# Patient Record
Sex: FEMALE | Race: WHITE | NOT HISPANIC OR LATINO | Employment: FULL TIME | URBAN - METROPOLITAN AREA
[De-identification: names, ages, dates, MRNs, and addresses within clinical notes are randomized per-mention and may not be internally consistent; named-entity substitution may affect disease eponyms.]

---

## 2023-05-04 ENCOUNTER — OFFICE VISIT (OUTPATIENT)
Dept: PRIMARY CARE | Facility: CLINIC | Age: 48
End: 2023-05-04
Payer: COMMERCIAL

## 2023-05-04 VITALS
DIASTOLIC BLOOD PRESSURE: 78 MMHG | SYSTOLIC BLOOD PRESSURE: 110 MMHG | BODY MASS INDEX: 23.43 KG/M2 | WEIGHT: 140.6 LBS | HEART RATE: 71 BPM | HEIGHT: 65 IN | OXYGEN SATURATION: 99 %

## 2023-05-04 DIAGNOSIS — K21.9 GASTROESOPHAGEAL REFLUX DISEASE WITHOUT ESOPHAGITIS: ICD-10-CM

## 2023-05-04 DIAGNOSIS — F32.1 DEPRESSION, MAJOR, SINGLE EPISODE, MODERATE (MULTI): ICD-10-CM

## 2023-05-04 DIAGNOSIS — J01.10 ACUTE FRONTAL SINUSITIS, RECURRENCE NOT SPECIFIED: Primary | ICD-10-CM

## 2023-05-04 PROCEDURE — 99214 OFFICE O/P EST MOD 30 MIN: CPT | Performed by: FAMILY MEDICINE

## 2023-05-04 RX ORDER — BUPROPION HYDROCHLORIDE 150 MG/1
1 TABLET, EXTENDED RELEASE ORAL 2 TIMES DAILY
COMMUNITY
Start: 2018-02-02 | End: 2023-05-04 | Stop reason: SDUPTHER

## 2023-05-04 RX ORDER — PANTOPRAZOLE SODIUM 40 MG/1
1 TABLET, DELAYED RELEASE ORAL DAILY
COMMUNITY
End: 2023-05-04 | Stop reason: SDUPTHER

## 2023-05-04 RX ORDER — PANTOPRAZOLE SODIUM 40 MG/1
40 TABLET, DELAYED RELEASE ORAL DAILY
Qty: 90 TABLET | Refills: 1 | Status: SHIPPED | OUTPATIENT
Start: 2023-05-04 | End: 2023-11-17 | Stop reason: SDUPTHER

## 2023-05-04 RX ORDER — LANOLIN ALCOHOL/MO/W.PET/CERES
CREAM (GRAM) TOPICAL
COMMUNITY

## 2023-05-04 RX ORDER — NICOTINE POLACRILEX 2 MG
GUM BUCCAL
COMMUNITY

## 2023-05-04 RX ORDER — CALCIUM CARBONATE 300MG(750)
400 TABLET,CHEWABLE ORAL DAILY
COMMUNITY

## 2023-05-04 RX ORDER — AZITHROMYCIN 250 MG/1
TABLET, FILM COATED ORAL
Qty: 6 TABLET | Refills: 0 | Status: SHIPPED | OUTPATIENT
Start: 2023-05-04 | End: 2023-05-09

## 2023-05-04 RX ORDER — BUPROPION HYDROCHLORIDE 150 MG/1
150 TABLET, EXTENDED RELEASE ORAL 2 TIMES DAILY
Qty: 180 TABLET | Refills: 1 | Status: SHIPPED | OUTPATIENT
Start: 2023-05-04 | End: 2023-11-10

## 2023-05-04 ASSESSMENT — ENCOUNTER SYMPTOMS
APPETITE CHANGE: 0
NAUSEA: 0
UNEXPECTED WEIGHT CHANGE: 0

## 2023-05-04 NOTE — PROGRESS NOTES
"Subjective   Patient ID: Kyung Velazquez is a 47 y.o. female who presents for pressure (In head not sure if sinus, neuro surgeon thought everything was OK s/p aneurysm next CT is due next year, sl cough, tension in back of neck and magnified pressure with the change in temp. No fevers).    HPI   Patient is seen in the office complaining of sinus congestion.  Complains of stuffed up head, drainage, cough.  Patient complains symptoms are moderate and patient feels mildly ill.  The symptoms are improved with OTC medication.  Patient also notes associated fatigue.   Review of Systems   Constitutional:  Negative for appetite change and unexpected weight change.   Eyes:  Negative for visual disturbance.   Gastrointestinal:  Negative for nausea.       Objective   /80   Pulse 71   Ht 1.638 m (5' 4.5\")   Wt 63.8 kg (140 lb 9.6 oz)   SpO2 99%   BMI 23.76 kg/m²     Physical Exam  HENT:      Head: Normocephalic and atraumatic.      Nose: Nose normal.      Mouth/Throat:      Mouth: Mucous membranes are moist.      Pharynx: No oropharyngeal exudate.   Eyes:      Extraocular Movements: Extraocular movements intact.      Conjunctiva/sclera: Conjunctivae normal.      Pupils: Pupils are equal, round, and reactive to light.   Cardiovascular:      Rate and Rhythm: Normal rate and regular rhythm.   Pulmonary:      Effort: Pulmonary effort is normal.   Abdominal:      General: There is no distension.      Palpations: Abdomen is soft.   Musculoskeletal:      Cervical back: Normal range of motion and neck supple.   Lymphadenopathy:      Cervical: No cervical adenopathy.   Neurological:      General: No focal deficit present.      Mental Status: She is alert.   Psychiatric:         Attention and Perception: Attention normal.         Speech: Speech normal.         Behavior: Behavior is cooperative.         Assessment/Plan   Diagnoses and all orders for this visit:  Acute frontal sinusitis, recurrence not " specified  Comments:  Supportive care and antibiotic  Doubt intracranial process given normal blood pressure and nonfocal exam  Orders:  -     azithromycin (Zithromax) 250 mg tablet; Take 2 tablets (500 mg) by mouth once daily for 1 day, THEN 1 tablet (250 mg) once daily for 4 days.  Depression, major, single episode, moderate (CMS/HCC)  Comments:  Controlled we will give refills  Orders:  -     buPROPion SR (Wellbutrin SR) 150 mg 12 hr tablet; Take 1 tablet (150 mg) by mouth 2 times a day.  Gastroesophageal reflux disease without esophagitis  Comments:  Controlled  Orders:  -     pantoprazole (ProtoNix) 40 mg EC tablet; Take 1 tablet (40 mg) by mouth once daily.  RTC if symptoms not improving in 1 to 2 weeks, discussed need to go to ED if any focal symptoms or symptoms of stroke appear

## 2023-05-04 NOTE — LETTER
May 4, 2023     Patient: Kyung Velazquez   YOB: 1975   Date of Visit: 5/4/2023       To Whom It May Concern:    Kyung Velazquez was seen in my clinic on 5/4/2023 at 10:00 am. Please excuse Kyung for her absence from work on this day to make the appointment.    If you have any questions or concerns, please don't hesitate to call.         Sincerely,         Taz Rodriguez MD        CC: No Recipients

## 2023-10-20 ENCOUNTER — HOSPITAL ENCOUNTER (OUTPATIENT)
Dept: RADIOLOGY | Facility: HOSPITAL | Age: 48
Discharge: HOME | End: 2023-10-20
Payer: COMMERCIAL

## 2023-10-20 ENCOUNTER — ANCILLARY PROCEDURE (OUTPATIENT)
Dept: RADIOLOGY | Facility: HOSPITAL | Age: 48
End: 2023-10-20
Payer: COMMERCIAL

## 2023-10-20 DIAGNOSIS — R92.8 OTHER ABNORMAL AND INCONCLUSIVE FINDINGS ON DIAGNOSTIC IMAGING OF BREAST: ICD-10-CM

## 2023-10-20 PROCEDURE — 77065 DX MAMMO INCL CAD UNI: CPT | Mod: RIGHT SIDE | Performed by: RADIOLOGY

## 2023-10-20 PROCEDURE — 77061 BREAST TOMOSYNTHESIS UNI: CPT | Mod: RIGHT SIDE | Performed by: RADIOLOGY

## 2023-10-20 PROCEDURE — 77061 BREAST TOMOSYNTHESIS UNI: CPT | Mod: RT

## 2023-11-10 DIAGNOSIS — F32.1 DEPRESSION, MAJOR, SINGLE EPISODE, MODERATE (MULTI): ICD-10-CM

## 2023-11-10 PROBLEM — J44.9 CHRONIC OBSTRUCTIVE PULMONARY DISEASE (MULTI): Status: ACTIVE | Noted: 2023-11-10

## 2023-11-10 PROBLEM — M25.571 ACUTE RIGHT ANKLE PAIN: Status: RESOLVED | Noted: 2023-11-10 | Resolved: 2023-11-10

## 2023-11-10 PROBLEM — I72.9 ANEURYSM (CMS-HCC): Status: ACTIVE | Noted: 2023-11-10

## 2023-11-10 PROBLEM — K14.6 BURNING TONGUE: Status: ACTIVE | Noted: 2023-11-10

## 2023-11-10 PROBLEM — F17.200 SMOKER: Status: ACTIVE | Noted: 2023-11-10

## 2023-11-10 PROBLEM — J30.1 SEASONAL ALLERGIC RHINITIS DUE TO POLLEN: Status: ACTIVE | Noted: 2023-11-10

## 2023-11-10 PROBLEM — H72.92 PERFORATION OF LEFT TYMPANIC MEMBRANE: Status: ACTIVE | Noted: 2023-11-10

## 2023-11-10 PROBLEM — M35.00 SJOGREN'S DISEASE (MULTI): Status: ACTIVE | Noted: 2023-11-10

## 2023-11-10 PROBLEM — I60.9 SUBARACHNOID HEMORRHAGE, NONTRAUMATIC (MULTI): Status: ACTIVE | Noted: 2023-11-10

## 2023-11-10 PROBLEM — E53.8 VITAMIN B 12 DEFICIENCY: Status: ACTIVE | Noted: 2023-11-10

## 2023-11-10 PROBLEM — E55.9 VITAMIN D DEFICIENCY: Status: ACTIVE | Noted: 2023-11-10

## 2023-11-10 PROBLEM — K21.00 GASTROESOPHAGEAL REFLUX DISEASE WITH ESOPHAGITIS: Status: ACTIVE | Noted: 2023-11-10

## 2023-11-10 PROBLEM — R53.81 MALAISE AND FATIGUE: Status: ACTIVE | Noted: 2023-11-10

## 2023-11-10 PROBLEM — H66.92 LEFT OTITIS MEDIA: Status: RESOLVED | Noted: 2023-11-10 | Resolved: 2023-11-10

## 2023-11-10 PROBLEM — J01.90 ACUTE SINUSITIS: Status: RESOLVED | Noted: 2023-11-10 | Resolved: 2023-11-10

## 2023-11-10 PROBLEM — M25.471 EFFUSION OF RIGHT ANKLE: Status: ACTIVE | Noted: 2023-11-10

## 2023-11-10 PROBLEM — H60.332 ACUTE SWIMMER'S EAR OF LEFT SIDE: Status: RESOLVED | Noted: 2023-11-10 | Resolved: 2023-11-10

## 2023-11-10 PROBLEM — R91.8 LUNG NODULES: Status: ACTIVE | Noted: 2023-11-10

## 2023-11-10 PROBLEM — R53.83 MALAISE AND FATIGUE: Status: ACTIVE | Noted: 2023-11-10

## 2023-11-10 RX ORDER — BUPROPION HYDROCHLORIDE 150 MG/1
150 TABLET, EXTENDED RELEASE ORAL 2 TIMES DAILY
Qty: 180 TABLET | Refills: 1 | Status: SHIPPED | OUTPATIENT
Start: 2023-11-10

## 2023-11-17 ENCOUNTER — OFFICE VISIT (OUTPATIENT)
Dept: DERMATOLOGY | Facility: CLINIC | Age: 48
End: 2023-11-17
Payer: COMMERCIAL

## 2023-11-17 ENCOUNTER — TELEPHONE (OUTPATIENT)
Dept: PRIMARY CARE | Facility: CLINIC | Age: 48
End: 2023-11-17

## 2023-11-17 DIAGNOSIS — D22.9 BENIGN NEVUS: ICD-10-CM

## 2023-11-17 DIAGNOSIS — L82.1 SEBORRHEIC KERATOSIS: ICD-10-CM

## 2023-11-17 DIAGNOSIS — D18.01 ANGIOMA OF SKIN: ICD-10-CM

## 2023-11-17 DIAGNOSIS — L82.0 INFLAMED SEBORRHEIC KERATOSIS: ICD-10-CM

## 2023-11-17 DIAGNOSIS — L57.9 SKIN CHANGES DUE TO CHRONIC EXPOSURE TO NONIONIZING RADIATION: ICD-10-CM

## 2023-11-17 DIAGNOSIS — D48.5 NEOPLASM OF UNCERTAIN BEHAVIOR OF SKIN: Primary | ICD-10-CM

## 2023-11-17 DIAGNOSIS — L81.4 LENTIGO: ICD-10-CM

## 2023-11-17 DIAGNOSIS — K21.9 GASTROESOPHAGEAL REFLUX DISEASE WITHOUT ESOPHAGITIS: ICD-10-CM

## 2023-11-17 PROCEDURE — 99213 OFFICE O/P EST LOW 20 MIN: CPT | Performed by: NURSE PRACTITIONER

## 2023-11-17 PROCEDURE — 11102 TANGNTL BX SKIN SINGLE LES: CPT | Performed by: NURSE PRACTITIONER

## 2023-11-17 PROCEDURE — 11103 TANGNTL BX SKIN EA SEP/ADDL: CPT | Performed by: NURSE PRACTITIONER

## 2023-11-17 PROCEDURE — 17110 DESTRUCTION B9 LES UP TO 14: CPT | Performed by: NURSE PRACTITIONER

## 2023-11-17 PROCEDURE — 88305 TISSUE EXAM BY PATHOLOGIST: CPT | Performed by: DERMATOLOGY

## 2023-11-17 PROCEDURE — 88305 TISSUE EXAM BY PATHOLOGIST: CPT | Mod: TC,DER | Performed by: NURSE PRACTITIONER

## 2023-11-17 NOTE — PROGRESS NOTES
Subjective     Kyung Velazquez is a 48 y.o. female who presents for the following: Skin Check.     2 moles on the back have become very itchy and irritated at times. Very itchy lesions on the waistline, left shoulder and back.     Review of Systems:  No other skin or systemic complaints other than what is documented elsewhere in the note.    The following portions of the chart were reviewed this encounter and updated as appropriate:   Tobacco  Allergies  Meds  Problems  Med Hx  Surg Hx         Skin Cancer History  No skin cancer on file.      Specialty Problems    None       Objective   Well appearing patient in no apparent distress; mood and affect are within normal limits.    A full examination was performed including scalp, head, eyes, ears, nose, lips, neck, chest, axillae, abdomen, back, buttocks, bilateral upper extremities, bilateral lower extremities, hands, feet, fingers, toes, fingernails, and toenails. All findings within normal limits unless otherwise noted below.      Assessment/Plan   1. Neoplasm of uncertain behavior of skin (2)  Right Upper Back  4.5 x 4 mm flesh toned to pink papule    Lesion biopsy  Type of biopsy: tangential    Informed consent: discussed and consent obtained    Timeout: patient name, date of birth, surgical site, and procedure verified    Procedure prep:  Patient was prepped and draped  Anesthesia: the lesion was anesthetized in a standard fashion    Anesthetic:  1% lidocaine plain local infiltration  Instrument used: DermaBlade    Hemostasis achieved with: electrodesiccation    Outcome: patient tolerated procedure well    Post-procedure details: wound care instructions given    Additional details:  Cleaned area with isopropyl alcohol prior to anesthesia or biopsy. Applied thin layer of vaseline and covered with bandaid after procedure      Staff Communication: Dermatology Local Anesthesia: 1 % Plain Lidocaine - Amount:0.5ml per lesion    Left Upper Back  3 mm dark brown  erythematous papule    Staff Communication: Dermatology Local Anesthesia: 1 % Plain Lidocaine - Amount:0.5ml per lesion    Lesion biopsy  Type of biopsy: tangential    Informed consent: discussed and consent obtained    Timeout: patient name, date of birth, surgical site, and procedure verified    Procedure prep:  Patient was prepped and draped  Anesthesia: the lesion was anesthetized in a standard fashion    Anesthetic:  1% lidocaine plain local infiltration  Instrument used: DermaBlade    Hemostasis achieved with: electrodesiccation    Outcome: patient tolerated procedure well    Post-procedure details: wound care instructions given    Additional details:  Cleaned area with isopropyl alcohol prior to anesthesia or biopsy. Applied thin layer of vaseline and covered with bandaid after procedure      NUB  - Given uncertainty in clinical diagnosis, shave biopsy is recommended in clinic today.  - The patient expressed understanding, is in agreement with this plan, and wishes to proceed with biopsy.  - Oral and written wound care instructions provided.  - Advised patient that the office will call within 2 weeks to discuss biopsy results.          Return to clinic in 1 year for skin check/follow up or sooner if needed

## 2023-11-17 NOTE — LETTER
November 17, 2023     Patient: Kyung Velazquez   YOB: 1975   Date of Visit: 11/17/2023       To Whom It May Concern:    Kyung Velazquez was seen in my clinic on 11/17/2023 at 1:30 pm. Please excuse Kyung for her absence from work on this day to make the appointment.    If you have any questions or concerns, please don't hesitate to call.         Sincerely,         Aldair Woody, APRN-CNP        CC: No Recipients

## 2023-11-17 NOTE — TELEPHONE ENCOUNTER
Discussed with pt. She now says there is no refill after 5/2003 Advised I think they just need a new script will send it in and see if it generates a prior auth. but I don't think it will be needed. Also advised she is overdue for 6 month appt. and she will arrange this now.

## 2023-11-17 NOTE — PATIENT INSTRUCTIONS

## 2023-11-17 NOTE — TELEPHONE ENCOUNTER
PT states the pharmacy advised her ABHI would have to authorize a refill for the Pantoprazole 40mg, because they told her she cannot get a refill until May 2024. And without the prior auth it will cost over 100.00. pt gave number  [phone] to call [employer].

## 2023-11-21 LAB
LAB AP ASR DISCLAIMER: NORMAL
LABORATORY COMMENT REPORT: NORMAL
PATH REPORT.FINAL DX SPEC: NORMAL
PATH REPORT.GROSS SPEC: NORMAL
PATH REPORT.MICROSCOPIC SPEC OTHER STN: NORMAL
PATH REPORT.RELEVANT HX SPEC: NORMAL
PATH REPORT.TOTAL CANCER: NORMAL

## 2023-11-22 ENCOUNTER — TELEPHONE (OUTPATIENT)
Dept: DERMATOLOGY | Facility: CLINIC | Age: 48
End: 2023-11-22
Payer: COMMERCIAL

## 2023-11-22 NOTE — TELEPHONE ENCOUNTER
A - benign. No further treatment required.   B - benign. No further treatment required.     Return to clinic in 1 year for recheck or sooner if needed

## 2023-11-29 ENCOUNTER — OFFICE VISIT (OUTPATIENT)
Dept: PRIMARY CARE | Facility: CLINIC | Age: 48
End: 2023-11-29
Payer: COMMERCIAL

## 2023-11-29 VITALS
BODY MASS INDEX: 23.93 KG/M2 | TEMPERATURE: 99.1 F | WEIGHT: 143.6 LBS | HEIGHT: 65 IN | SYSTOLIC BLOOD PRESSURE: 112 MMHG | OXYGEN SATURATION: 98 % | DIASTOLIC BLOOD PRESSURE: 68 MMHG | HEART RATE: 78 BPM

## 2023-11-29 DIAGNOSIS — I67.1 CEREBRAL ANEURYSM, NONRUPTURED (HHS-HCC): Primary | ICD-10-CM

## 2023-11-29 DIAGNOSIS — J01.00 ACUTE MAXILLARY SINUSITIS, RECURRENCE NOT SPECIFIED: ICD-10-CM

## 2023-11-29 DIAGNOSIS — M35.00 SJOGREN'S SYNDROME, WITH UNSPECIFIED ORGAN INVOLVEMENT (MULTI): ICD-10-CM

## 2023-11-29 DIAGNOSIS — J43.1 PANLOBULAR EMPHYSEMA (MULTI): ICD-10-CM

## 2023-11-29 PROCEDURE — 99213 OFFICE O/P EST LOW 20 MIN: CPT | Performed by: FAMILY MEDICINE

## 2023-11-29 RX ORDER — AZITHROMYCIN 250 MG/1
TABLET, FILM COATED ORAL
Qty: 6 TABLET | Refills: 0 | Status: SHIPPED | OUTPATIENT
Start: 2023-11-29 | End: 2023-12-03

## 2023-11-29 RX ORDER — PANTOPRAZOLE SODIUM 40 MG/1
40 TABLET, DELAYED RELEASE ORAL DAILY
Qty: 90 TABLET | Refills: 0 | Status: SHIPPED | OUTPATIENT
Start: 2023-11-29 | End: 2024-03-29

## 2023-11-29 ASSESSMENT — PATIENT HEALTH QUESTIONNAIRE - PHQ9
1. LITTLE INTEREST OR PLEASURE IN DOING THINGS: NOT AT ALL
2. FEELING DOWN, DEPRESSED OR HOPELESS: NOT AT ALL
SUM OF ALL RESPONSES TO PHQ9 QUESTIONS 1 AND 2: 0

## 2023-11-29 ASSESSMENT — ENCOUNTER SYMPTOMS
COUGH: 1
SORE THROAT: 1
APPETITE CHANGE: 0
UNEXPECTED WEIGHT CHANGE: 0
FEVER: 1
NAUSEA: 0

## 2023-11-29 ASSESSMENT — COLUMBIA-SUICIDE SEVERITY RATING SCALE - C-SSRS
6. HAVE YOU EVER DONE ANYTHING, STARTED TO DO ANYTHING, OR PREPARED TO DO ANYTHING TO END YOUR LIFE?: NO
2. HAVE YOU ACTUALLY HAD ANY THOUGHTS OF KILLING YOURSELF?: NO
1. IN THE PAST MONTH, HAVE YOU WISHED YOU WERE DEAD OR WISHED YOU COULD GO TO SLEEP AND NOT WAKE UP?: NO

## 2023-11-29 NOTE — TELEPHONE ENCOUNTER
PT states she is having runny nose and sinus pressure with a low grade fever, with ear pain - her  Satya was in 2 days ago with the same symptoms. Requesting medication.

## 2023-11-29 NOTE — PROGRESS NOTES
"Subjective   Patient ID: Kyung Velazquez is a 48 y.o. female who presents for Sore Throat (Couple days), Fever, Cough, and Nasal Congestion.    Sore Throat   Associated symptoms include coughing.   Fever   Associated symptoms include coughing and a sore throat. Pertinent negatives include no nausea.   Cough  Associated symptoms include a fever and a sore throat.        Review of Systems   Constitutional:  Positive for fever. Negative for appetite change and unexpected weight change.   HENT:  Positive for sore throat.    Eyes:  Negative for visual disturbance.   Respiratory:  Positive for cough.    Gastrointestinal:  Negative for nausea.       Objective   /68   Pulse 78   Temp 37.3 °C (99.1 °F)   Ht 1.638 m (5' 4.5\")   Wt 65.1 kg (143 lb 9.6 oz)   SpO2 98%   BMI 24.27 kg/m²     Physical Exam  HENT:      Head: Normocephalic and atraumatic.      Nose: Nose normal.      Mouth/Throat:      Mouth: Mucous membranes are moist.      Pharynx: No oropharyngeal exudate.   Eyes:      Extraocular Movements: Extraocular movements intact.      Conjunctiva/sclera: Conjunctivae normal.      Pupils: Pupils are equal, round, and reactive to light.   Cardiovascular:      Rate and Rhythm: Normal rate and regular rhythm.   Pulmonary:      Effort: Pulmonary effort is normal.   Abdominal:      General: There is no distension.      Palpations: Abdomen is soft.   Musculoskeletal:      Cervical back: Normal range of motion and neck supple.   Lymphadenopathy:      Cervical: No cervical adenopathy.   Neurological:      General: No focal deficit present.      Mental Status: She is alert.   Psychiatric:         Attention and Perception: Attention normal.         Speech: Speech normal.         Behavior: Behavior is cooperative.         Assessment/Plan   Diagnoses and all orders for this visit:  Cerebral aneurysm, nonruptured  Comments:  Followed by neurosurgery  Sjogren's syndrome, with unspecified organ involvement (CMS/HCC)  Panlobular " emphysema (CMS/[facility])  Comments:  Stable and not acting up currently  Acute maxillary sinusitis, recurrence not specified  -     azithromycin (Zithromax) 250 mg tablet; Take 2 tablets (500 mg) by mouth once daily for 1 day, THEN 1 tablet (250 mg) once daily for 4 days.  Supportive care  Recheck 1 week if not better sooner if worse

## 2023-11-29 NOTE — TELEPHONE ENCOUNTER
Spoke with pt. Advised she would need to be seen and an appt. was arranged for today.   For some reason this refill was never sent in?

## 2023-12-21 ENCOUNTER — APPOINTMENT (OUTPATIENT)
Dept: PRIMARY CARE | Facility: CLINIC | Age: 48
End: 2023-12-21
Payer: COMMERCIAL

## 2024-01-08 ENCOUNTER — TELEPHONE (OUTPATIENT)
Dept: PRIMARY CARE | Facility: CLINIC | Age: 49
End: 2024-01-08
Payer: COMMERCIAL

## 2024-03-28 ENCOUNTER — HOSPITAL ENCOUNTER (OUTPATIENT)
Dept: RADIOLOGY | Facility: HOSPITAL | Age: 49
End: 2024-03-28
Payer: COMMERCIAL

## 2024-03-28 ENCOUNTER — APPOINTMENT (OUTPATIENT)
Dept: RADIOLOGY | Facility: HOSPITAL | Age: 49
End: 2024-03-28
Payer: COMMERCIAL

## 2024-03-28 ENCOUNTER — HOSPITAL ENCOUNTER (OUTPATIENT)
Dept: RADIOLOGY | Facility: HOSPITAL | Age: 49
Discharge: HOME | End: 2024-03-28
Payer: COMMERCIAL

## 2024-03-28 ENCOUNTER — TELEPHONE (OUTPATIENT)
Dept: PRIMARY CARE | Facility: CLINIC | Age: 49
End: 2024-03-28
Payer: COMMERCIAL

## 2024-03-28 VITALS — WEIGHT: 135 LBS | BODY MASS INDEX: 22.49 KG/M2 | HEIGHT: 65 IN

## 2024-03-28 DIAGNOSIS — R92.8 OTHER ABNORMAL AND INCONCLUSIVE FINDINGS ON DIAGNOSTIC IMAGING OF BREAST: ICD-10-CM

## 2024-03-28 DIAGNOSIS — K21.9 GASTROESOPHAGEAL REFLUX DISEASE WITHOUT ESOPHAGITIS: ICD-10-CM

## 2024-03-28 PROCEDURE — 77062 BREAST TOMOSYNTHESIS BI: CPT

## 2024-03-28 PROCEDURE — 77066 DX MAMMO INCL CAD BI: CPT | Performed by: STUDENT IN AN ORGANIZED HEALTH CARE EDUCATION/TRAINING PROGRAM

## 2024-03-28 PROCEDURE — 77062 BREAST TOMOSYNTHESIS BI: CPT | Performed by: STUDENT IN AN ORGANIZED HEALTH CARE EDUCATION/TRAINING PROGRAM

## 2024-03-28 NOTE — TELEPHONE ENCOUNTER
MEDICATION REFILL    Pharmacy Name:    [facility]  Medication requested             Pantoprazole    40 mg  Dosage     1 x daily  Quantity        90 days   Allergies     none given  Date of last visit    11/29/2023  Date of Next Appointment    04/17/2024

## 2024-03-29 DIAGNOSIS — K21.9 GASTROESOPHAGEAL REFLUX DISEASE WITHOUT ESOPHAGITIS: ICD-10-CM

## 2024-03-29 RX ORDER — PANTOPRAZOLE SODIUM 40 MG/1
40 TABLET, DELAYED RELEASE ORAL DAILY
Qty: 90 TABLET | Refills: 0 | Status: SHIPPED | OUTPATIENT
Start: 2024-03-29

## 2024-04-17 ENCOUNTER — APPOINTMENT (OUTPATIENT)
Dept: PRIMARY CARE | Facility: CLINIC | Age: 49
End: 2024-04-17
Payer: COMMERCIAL

## 2024-04-17 PROBLEM — B37.0 CANDIDIASIS OF MOUTH: Status: RESOLVED | Noted: 2024-04-17 | Resolved: 2024-04-17

## 2024-04-17 PROBLEM — M54.6 PAIN IN THORACIC SPINE: Status: ACTIVE | Noted: 2024-04-17

## 2024-04-17 PROBLEM — D22.5 MELANOCYTIC NEVI OF TRUNK: Status: ACTIVE | Noted: 2022-11-18

## 2024-04-17 PROBLEM — L85.3 XEROSIS CUTIS: Status: ACTIVE | Noted: 2021-12-14

## 2024-04-17 PROBLEM — D48.5 NEOPLASM OF UNCERTAIN BEHAVIOR OF SKIN: Status: ACTIVE | Noted: 2024-04-17

## 2024-04-17 PROBLEM — L91.8 OTHER HYPERTROPHIC DISORDERS OF THE SKIN: Status: ACTIVE | Noted: 2022-11-18

## 2024-04-17 PROBLEM — N39.0 ACUTE URINARY TRACT INFECTION: Status: RESOLVED | Noted: 2024-04-17 | Resolved: 2024-04-17

## 2024-04-17 PROBLEM — D22.60 MELANOCYTIC NEVI OF UNSPECIFIED UPPER LIMB, INCLUDING SHOULDER: Status: ACTIVE | Noted: 2020-06-22

## 2024-04-17 PROBLEM — R59.9 ADENOPATHY: Status: ACTIVE | Noted: 2024-04-17

## 2024-04-17 PROBLEM — L81.4 LENTIGINOSIS: Status: ACTIVE | Noted: 2022-11-18

## 2024-04-17 PROBLEM — D22.30 MELANOCYTIC NEVI OF UNSPECIFIED PART OF FACE: Status: ACTIVE | Noted: 2022-11-18

## 2024-04-17 PROBLEM — L57.9 SKIN CHANGES DUE TO CHRONIC EXPOSURE TO NONIONIZING RADIATION: Status: ACTIVE | Noted: 2024-04-17

## 2024-04-17 PROBLEM — F32.1 MAJOR DEPRESSIVE DISORDER, SINGLE EPISODE, MODERATE (MULTI): Status: ACTIVE | Noted: 2024-04-17

## 2024-04-17 PROBLEM — L81.4 OTHER MELANIN HYPERPIGMENTATION: Status: ACTIVE | Noted: 2022-11-18

## 2024-04-17 PROBLEM — L72.11 PILAR CYST: Status: ACTIVE | Noted: 2020-06-22

## 2024-04-17 PROBLEM — L72.8 OTHER FOLLICULAR CYSTS OF THE SKIN AND SUBCUTANEOUS TISSUE: Status: ACTIVE | Noted: 2020-06-22

## 2024-04-17 PROBLEM — D22.70 MELANOCYTIC NEVI OF UNSPECIFIED LOWER LIMB, INCLUDING HIP: Status: ACTIVE | Noted: 2022-11-18

## 2024-04-17 PROBLEM — R10.11 RIGHT UPPER QUADRANT ABDOMINAL PAIN: Status: RESOLVED | Noted: 2024-04-17 | Resolved: 2024-04-17

## 2024-04-17 PROBLEM — D22.9 MELANOCYTIC NEVUS OF SKIN: Status: ACTIVE | Noted: 2024-04-17

## 2024-04-17 PROBLEM — R11.2 NAUSEA AND VOMITING: Status: RESOLVED | Noted: 2024-04-17 | Resolved: 2024-04-17

## 2024-04-17 PROBLEM — R00.2 PALPITATIONS: Status: ACTIVE | Noted: 2024-04-17

## 2024-04-17 PROBLEM — L82.0 INFLAMED SEBORRHEIC KERATOSIS: Status: ACTIVE | Noted: 2022-11-18

## 2024-04-17 PROBLEM — L98.9 SKIN LESION: Status: ACTIVE | Noted: 2022-11-18

## 2024-06-24 ENCOUNTER — TELEPHONE (OUTPATIENT)
Dept: PRIMARY CARE | Facility: CLINIC | Age: 49
End: 2024-06-24
Payer: COMMERCIAL

## 2024-06-24 DIAGNOSIS — K21.9 GASTROESOPHAGEAL REFLUX DISEASE WITHOUT ESOPHAGITIS: ICD-10-CM

## 2024-06-24 DIAGNOSIS — F32.1 DEPRESSION, MAJOR, SINGLE EPISODE, MODERATE (MULTI): ICD-10-CM

## 2024-06-24 NOTE — TELEPHONE ENCOUNTER
Rx Refill Request Telephone Encounter    Name:  Kyung Velazquez  :  [id number]  Medication Name:    pantoprazole (ProtoNix) 40 mg EC tablet - Take 1 tablet (40 mg) by mouth once daily. - 90 day    buPROPion SR (Wellbutrin SR) 150 mg 12 hr tablet TAKE 1 TABLET TWICE A DAY - requesting 180 days  Specific Pharmacy location:  [facility]  Date of last appointment: 2023  Date of next appointment:  na  Best number to reach patient:  [phone]

## 2024-06-25 RX ORDER — BUPROPION HYDROCHLORIDE 150 MG/1
150 TABLET, EXTENDED RELEASE ORAL 2 TIMES DAILY
Qty: 180 TABLET | Refills: 1 | Status: SHIPPED | OUTPATIENT
Start: 2024-06-25

## 2024-06-25 RX ORDER — PANTOPRAZOLE SODIUM 40 MG/1
40 TABLET, DELAYED RELEASE ORAL DAILY
Qty: 90 TABLET | Refills: 3 | Status: SHIPPED | OUTPATIENT
Start: 2024-06-25

## 2024-09-04 DIAGNOSIS — R92.0 MICROCALCIFICATIONS OF THE BREAST: Primary | ICD-10-CM

## 2024-09-10 ENCOUNTER — APPOINTMENT (OUTPATIENT)
Dept: PRIMARY CARE | Facility: CLINIC | Age: 49
End: 2024-09-10
Payer: COMMERCIAL

## 2024-09-10 DIAGNOSIS — I67.1 CEREBRAL ARTERIAL ANEURYSM (HHS-HCC): ICD-10-CM

## 2024-09-24 ENCOUNTER — OFFICE VISIT (OUTPATIENT)
Dept: PRIMARY CARE | Facility: CLINIC | Age: 49
End: 2024-09-24
Payer: COMMERCIAL

## 2024-09-24 VITALS
HEIGHT: 65 IN | WEIGHT: 142.4 LBS | BODY MASS INDEX: 23.72 KG/M2 | HEART RATE: 78 BPM | DIASTOLIC BLOOD PRESSURE: 76 MMHG | TEMPERATURE: 97.6 F | SYSTOLIC BLOOD PRESSURE: 124 MMHG | OXYGEN SATURATION: 98 %

## 2024-09-24 DIAGNOSIS — J01.00 ACUTE MAXILLARY SINUSITIS, RECURRENCE NOT SPECIFIED: ICD-10-CM

## 2024-09-24 DIAGNOSIS — F32.1 DEPRESSION, MAJOR, SINGLE EPISODE, MODERATE (MULTI): ICD-10-CM

## 2024-09-24 DIAGNOSIS — H72.92 PERFORATION OF LEFT TYMPANIC MEMBRANE: ICD-10-CM

## 2024-09-24 DIAGNOSIS — H60.332 ACUTE SWIMMER'S EAR OF LEFT SIDE: Primary | ICD-10-CM

## 2024-09-24 DIAGNOSIS — I67.1 CEREBRAL ANEURYSM, NONRUPTURED (HHS-HCC): ICD-10-CM

## 2024-09-24 PROBLEM — J44.9 CHRONIC OBSTRUCTIVE PULMONARY DISEASE (MULTI): Status: RESOLVED | Noted: 2023-11-10 | Resolved: 2024-09-24

## 2024-09-24 PROBLEM — M35.00 SJOGREN'S DISEASE: Status: RESOLVED | Noted: 2023-11-10 | Resolved: 2024-09-24

## 2024-09-24 PROCEDURE — 99213 OFFICE O/P EST LOW 20 MIN: CPT | Performed by: FAMILY MEDICINE

## 2024-09-24 PROCEDURE — 3008F BODY MASS INDEX DOCD: CPT | Performed by: FAMILY MEDICINE

## 2024-09-24 RX ORDER — FLUTICASONE PROPIONATE 50 MCG
1 SPRAY, SUSPENSION (ML) NASAL DAILY
Qty: 16 G | Refills: 1 | Status: SHIPPED | OUTPATIENT
Start: 2024-09-24 | End: 2025-09-24

## 2024-09-24 RX ORDER — AZITHROMYCIN 250 MG/1
TABLET, FILM COATED ORAL
Qty: 6 TABLET | Refills: 0 | Status: SHIPPED | OUTPATIENT
Start: 2024-09-24 | End: 2024-09-28

## 2024-09-24 RX ORDER — BUPROPION HYDROCHLORIDE 150 MG/1
150 TABLET, EXTENDED RELEASE ORAL 2 TIMES DAILY
Qty: 180 TABLET | Refills: 1 | Status: SHIPPED | OUTPATIENT
Start: 2024-09-24

## 2024-09-24 RX ORDER — CALCIUM CARBONATE 500(1250)
1 TABLET ORAL DAILY
COMMUNITY
Start: 2020-06-22

## 2024-09-24 RX ORDER — OFLOXACIN 3 MG/ML
5 SOLUTION AURICULAR (OTIC) 2 TIMES DAILY
Qty: 5 ML | Refills: 0 | Status: SHIPPED | OUTPATIENT
Start: 2024-09-24 | End: 2024-10-01

## 2024-09-24 ASSESSMENT — ENCOUNTER SYMPTOMS
APPETITE CHANGE: 0
UNEXPECTED WEIGHT CHANGE: 0
NAUSEA: 0

## 2024-09-24 NOTE — PROGRESS NOTES
"Subjective   Patient ID: Kyung Velazquez is a 49 y.o. female who presents for sinus and ear pain (Symptoms started last Thursday, started to feel a little better but now the congestion has settled into her chest and ear pain.  Left ear is painful, but both ears are clogged.  When she coughs the mucous is a yellow/green color.  Did have a low grade fever on Saturday.  No COVID test done.   Pt has tried Dayquil which has helped a little.  Did haves chills and aches early on but those have subsided).    HPI   Patient is seen in the office complaining of sinus congestion.  Complains of stuffed up head, drainage, cough.  Patient complains symptoms are moderate and patient feels mildly ill.  The symptoms are improved with OTC medication.  Patient also notes associated fatigue.      Review of Systems   Constitutional:  Negative for appetite change and unexpected weight change.   Eyes:  Negative for visual disturbance.   Gastrointestinal:  Negative for nausea.       Objective   /76   Pulse 78   Temp 36.4 °C (97.6 °F) (Temporal)   Ht 1.651 m (5' 5\")   Wt 64.6 kg (142 lb 6.4 oz)   SpO2 98%   BMI 23.70 kg/m²     Physical Exam  HENT:      Head: Normocephalic and atraumatic.      Nose: Nose normal.      Mouth/Throat:      Mouth: Mucous membranes are moist.      Pharynx: No oropharyngeal exudate.   Eyes:      Extraocular Movements: Extraocular movements intact.      Conjunctiva/sclera: Conjunctivae normal.      Pupils: Pupils are equal, round, and reactive to light.   Cardiovascular:      Rate and Rhythm: Normal rate and regular rhythm.   Pulmonary:      Effort: Pulmonary effort is normal.      Breath sounds: Normal breath sounds.   Abdominal:      General: There is no distension.      Palpations: Abdomen is soft.   Musculoskeletal:      Cervical back: Normal range of motion and neck supple.   Lymphadenopathy:      Cervical: No cervical adenopathy.   Neurological:      General: No focal deficit present.      Mental " Status: She is alert.   Psychiatric:         Attention and Perception: Attention normal.         Speech: Speech normal.         Behavior: Behavior is cooperative.         Assessment/Plan   Problem List Items Addressed This Visit             ICD-10-CM    Perforation of left tympanic membrane H72.92     Benefits discussed and add medication as directed  Do follow-up as scheduled with ear specialist in the next week or 2          Other Visit Diagnoses         Codes    Acute swimmer's ear of left side    -  Primary H60.332    Relevant Medications    ofloxacin (Floxin) 0.3 % otic solution    Acute maxillary sinusitis, recurrence not specified     J01.00    Relevant Medications    azithromycin (Zithromax) 250 mg tablet    fluticasone (Flonase) 50 mcg/actuation nasal spray        Benefits discussed and add medication as directed  Supportive care  Recheck 1 week sooner if there are any issues

## 2024-09-24 NOTE — ASSESSMENT & PLAN NOTE
Benefits discussed and add medication as directed  Do follow-up as scheduled with ear specialist in the next week or 2

## 2024-10-03 ENCOUNTER — HOSPITAL ENCOUNTER (OUTPATIENT)
Dept: RADIOLOGY | Facility: HOSPITAL | Age: 49
Discharge: HOME | End: 2024-10-03
Payer: COMMERCIAL

## 2024-10-03 ENCOUNTER — APPOINTMENT (OUTPATIENT)
Dept: RADIOLOGY | Facility: HOSPITAL | Age: 49
End: 2024-10-03
Payer: COMMERCIAL

## 2024-10-03 VITALS — WEIGHT: 134 LBS | HEIGHT: 65 IN | BODY MASS INDEX: 22.33 KG/M2

## 2024-10-03 DIAGNOSIS — R92.0 MICROCALCIFICATIONS OF THE BREAST: ICD-10-CM

## 2024-10-03 PROCEDURE — 77065 DX MAMMO INCL CAD UNI: CPT | Mod: RT

## 2024-10-29 ENCOUNTER — LAB (OUTPATIENT)
Dept: LAB | Facility: LAB | Age: 49
End: 2024-10-29
Payer: COMMERCIAL

## 2024-10-29 DIAGNOSIS — I67.1 CEREBRAL ARTERIAL ANEURYSM (HHS-HCC): ICD-10-CM

## 2024-10-29 LAB
BUN SERPL-MCNC: 13 MG/DL (ref 6–23)
CREAT SERPL-MCNC: 0.66 MG/DL (ref 0.5–1.05)
EGFRCR SERPLBLD CKD-EPI 2021: >90 ML/MIN/1.73M*2

## 2024-10-29 PROCEDURE — 82565 ASSAY OF CREATININE: CPT

## 2024-10-29 PROCEDURE — 84520 ASSAY OF UREA NITROGEN: CPT

## 2024-10-29 PROCEDURE — 36415 COLL VENOUS BLD VENIPUNCTURE: CPT

## 2024-11-01 ENCOUNTER — HOSPITAL ENCOUNTER (OUTPATIENT)
Dept: RADIOLOGY | Facility: HOSPITAL | Age: 49
Discharge: HOME | End: 2024-11-01
Payer: COMMERCIAL

## 2024-11-01 DIAGNOSIS — I67.1 CEREBRAL ARTERIAL ANEURYSM (HHS-HCC): ICD-10-CM

## 2024-11-01 PROCEDURE — 2550000001 HC RX 255 CONTRASTS: Performed by: NEUROLOGICAL SURGERY

## 2024-11-01 PROCEDURE — 70496 CT ANGIOGRAPHY HEAD: CPT

## 2024-11-15 DIAGNOSIS — J01.00 ACUTE MAXILLARY SINUSITIS, RECURRENCE NOT SPECIFIED: ICD-10-CM

## 2024-11-15 RX ORDER — FLUTICASONE PROPIONATE 50 MCG
1 SPRAY, SUSPENSION (ML) NASAL DAILY
Qty: 48 ML | Refills: 3 | Status: SHIPPED | OUTPATIENT
Start: 2024-11-15 | End: 2025-11-15

## 2024-11-27 ENCOUNTER — APPOINTMENT (OUTPATIENT)
Dept: DERMATOLOGY | Facility: CLINIC | Age: 49
End: 2024-11-27
Payer: COMMERCIAL

## 2024-11-27 DIAGNOSIS — D22.9 BENIGN NEVUS: ICD-10-CM

## 2024-11-27 DIAGNOSIS — L82.1 SEBORRHEIC KERATOSIS: ICD-10-CM

## 2024-11-27 DIAGNOSIS — L81.4 LENTIGO: ICD-10-CM

## 2024-11-27 DIAGNOSIS — D18.01 ANGIOMA OF SKIN: ICD-10-CM

## 2024-11-27 DIAGNOSIS — L57.9 SKIN CHANGES DUE TO CHRONIC EXPOSURE TO NONIONIZING RADIATION: ICD-10-CM

## 2024-11-27 PROCEDURE — 99213 OFFICE O/P EST LOW 20 MIN: CPT | Performed by: NURSE PRACTITIONER

## 2024-11-27 NOTE — PROGRESS NOTES
Subjective     Kyung Velazquez is a 49 y.o. female who presents for the following: Skin Check.     Review of Systems:  No other skin or systemic complaints other than what is documented elsewhere in the note.    The following portions of the chart were reviewed this encounter and updated as appropriate:   Tobacco  Allergies  Meds  Problems  Med Hx  Surg Hx         Skin Cancer History  No skin cancer on file.      Specialty Problems          Dermatology Problems    Melanocytic nevi of unspecified upper limb, including shoulder    Other follicular cysts of the skin and subcutaneous tissue    Pilar cyst    Xerosis cutis    Inflamed seborrheic keratosis    Lentiginosis    Melanocytic nevi of trunk    Melanocytic nevi of unspecified lower limb, including hip    Melanocytic nevi of unspecified part of face    Other hypertrophic disorders of the skin    Other melanin hyperpigmentation    Skin lesion    Melanocytic nevus of skin    Neoplasm of uncertain behavior of skin    Skin changes due to chronic exposure to nonionizing radiation        Objective   Well appearing patient in no apparent distress; mood and affect are within normal limits.    A full examination was performed including scalp, head, eyes, ears, nose, lips, neck, chest, axillae, abdomen, back, buttocks, bilateral upper extremities, bilateral lower extremities, hands, feet, fingers, toes, fingernails, and toenails. All findings within normal limits unless otherwise noted below.      Assessment/Plan   1. Angioma of skin  Scattered cherry-red papule(s).    A cherry hemangioma is a small macule (small, flat, smooth area) or papule (small, solid bump) formed from an overgrowth of tiny blood vessels in the skin. Cherry hemangiomas are characteristically red or purplish in color. They often first appear in middle adulthood and usually increase in number with age. Cherry hemangiomas are noncancerous (benign) and are common in adults.    The present appearance of  the lesion is not worrisome but it should continue to be observed and testing/treatment may be warranted if change occurs.    Related Procedures  Follow Up In Dermatology - Established Patient    2. Benign nevus (2)  Generalized, Right Shoulder - Posterior  Scattered, uniform and benign-appearing, regular brown melanocytic papules and macules.    1. Right deltoid has a 4 x 2.5 mm dark brownmacule with slight disorginized reticular globularpattern with grey dots scattered somewhat symmetrically    2. Left waistline has a 3 x 3.5 mm black brownmacule with slight erythema    3. Left buttock has a 6 x 6.5 mm somewhatfeature poor tan brown dark brown macule.     The present appearance of the lesions are not worrisome but it should continue to be observed and testing/treatment may be warranted if change occurs.    Lesions being monitored are stable.     Related Procedures  Follow Up In Dermatology - Established Patient    3. Seborrheic keratosis  Stuck on verrucous, tan-brown papules and plaques.      Seborrheic keratoses are common noncancerous (benign) growths of unknown cause seen in adults due to a thickening of an area of the top skin layer. Seborrheic keratoses may appear as if they are stuck on to the skin. They have distinct borders, and they may appear as papules (small, solid bumps) or plaques (solid, raised patches that are bigger than a thumbnail). They may be the same color as your skin, or they may be pink, light brown, darker brown, or very dark brown, or sometimes may appear black.    There is no way to prevent new seborrheic keratoses from forming. Seborrheic keratoses can be removed, but removal is considered a cosmetic issue and is usually not covered by insurance.    PLAN  No treatment is needed unless there is irritation from clothing, such as itching or bleeding.  2.   Some lotions containing alpha hydroxy acids, salicylic acid, or urea may make the areas feel smoother with regular use but will not  eliminate them.    Related Procedures  Follow Up In Dermatology - Established Patient    4. Lentigo  Scattered tan macules in sun-exposed areas.    A solar lentigo (plural, solar lentigines), also known as a sun-induced freckle or senile lentigo, is a dark (hyperpigmented) lesion caused by natural or artificial ultraviolet (UV) light. Solar lentigines may be single or multiple. This type of lentigo is different from a simple lentigo (lentigo simplex) because it is caused by exposure to UV light. Solar lentigines are benign, but they do indicate excessive sun exposure, a risk factor for the development of skin cancer.    To prevent solar lentigines, avoid exposure to sunlight in midday (10 AM to 3 PM), wear sun-protective clothing (tightly woven clothes and hats), and apply sunscreen (SPF 30 UVA and UVB block).    The present appearance of the lesion is not worrisome but it should continue to be observed and testing/treatment may be warranted if change occurs.    Related Procedures  Follow Up In Dermatology - Established Patient    5. Skin changes due to chronic exposure to nonionizing radiation  Actinic changes in the form of freckles, lentigines and hyper/hypopigmentation     ABCDEs of melanoma and atypical moles were discussed with the patient.    Patient was instructed to perform monthly self skin examination.  We recommended that the patient have regular full skin exams given an increased risk of subsequent skin cancers.    The patient was instructed to use sun protective behaviors including use of broad spectrum sunscreens and sun protective clothing to reduce risk of skin cancers.    Warning signs of non-melanoma skin cancer discussed.    Related Procedures  Follow Up In Dermatology - Established Patient        Return to clinic in 1 year for skin check/follow up or sooner if needed

## 2024-11-27 NOTE — PATIENT INSTRUCTIONS

## 2025-03-03 ENCOUNTER — TELEPHONE (OUTPATIENT)
Facility: CLINIC | Age: 50
End: 2025-03-03

## 2025-03-03 NOTE — TELEPHONE ENCOUNTER
Requests a call back as she states she has quite a few orders for breast imaging and wants to know what she really needs to have done.

## 2025-05-05 ENCOUNTER — HOSPITAL ENCOUNTER (OUTPATIENT)
Dept: RADIOLOGY | Facility: HOSPITAL | Age: 50
Discharge: HOME | End: 2025-05-05
Payer: COMMERCIAL

## 2025-05-05 VITALS — HEIGHT: 65 IN | BODY MASS INDEX: 22.33 KG/M2 | WEIGHT: 134 LBS

## 2025-05-05 DIAGNOSIS — R92.8 OTHER ABNORMAL AND INCONCLUSIVE FINDINGS ON DIAGNOSTIC IMAGING OF BREAST: ICD-10-CM

## 2025-05-05 DIAGNOSIS — R92.8 ABNORMAL MAMMOGRAM OF RIGHT BREAST: ICD-10-CM

## 2025-05-05 PROCEDURE — 77062 BREAST TOMOSYNTHESIS BI: CPT | Performed by: RADIOLOGY

## 2025-05-05 PROCEDURE — 76642 ULTRASOUND BREAST LIMITED: CPT | Performed by: RADIOLOGY

## 2025-05-05 PROCEDURE — 76642 ULTRASOUND BREAST LIMITED: CPT | Mod: RT

## 2025-05-05 PROCEDURE — 76981 USE PARENCHYMA: CPT

## 2025-05-05 PROCEDURE — 77066 DX MAMMO INCL CAD BI: CPT | Performed by: RADIOLOGY

## 2025-05-05 PROCEDURE — 77062 BREAST TOMOSYNTHESIS BI: CPT

## 2025-05-06 ENCOUNTER — TELEPHONE (OUTPATIENT)
Dept: PRIMARY CARE | Facility: CLINIC | Age: 50
End: 2025-05-06
Payer: COMMERCIAL

## 2025-05-06 ENCOUNTER — TELEPHONE (OUTPATIENT)
Facility: CLINIC | Age: 50
End: 2025-05-06
Payer: COMMERCIAL

## 2025-05-06 NOTE — TELEPHONE ENCOUNTER
Dr. Rodriguez has spoken with pt regarding her results.  Appointment has been made with Dr. Wan on 5/15/25 at 9:00 am.  Pt is confirming this appointment with Dr. Wan's office

## 2025-05-06 NOTE — TELEPHONE ENCOUNTER
Pt is calling and is needing a biopsy on her right breast, Pt would like to speak to Dr Mendez about this before moving forward.

## 2025-05-06 NOTE — TELEPHONE ENCOUNTER
----- Message from Taz Rodriguez sent at 5/6/2025  7:54 AM EDT -----  If Dr Mendez has not ordered a stereotactic guided biopsy for this please order 1 for patient and let her know she will have to have a biopsy although radiologist does note it is low risk but   suspicious for cancer  ----- Message -----  From: Interface, Radiology Results In  Sent: 5/5/2025   1:47 PM EDT  To: Taz Rodriguez MD

## 2025-05-07 NOTE — RESULT ENCOUNTER NOTE
Spoke with patient on phone, discussed mammogram results and recommendation for biopsy, pt declines biopsy but has follow scheduled with Dr. Wan.   Stacy Bello, APRN-CNM

## 2025-05-08 ENCOUNTER — OFFICE VISIT (OUTPATIENT)
Dept: SURGERY | Facility: CLINIC | Age: 50
End: 2025-05-08
Payer: COMMERCIAL

## 2025-05-08 VITALS
WEIGHT: 142 LBS | SYSTOLIC BLOOD PRESSURE: 145 MMHG | TEMPERATURE: 96.8 F | HEIGHT: 65 IN | BODY MASS INDEX: 23.66 KG/M2 | DIASTOLIC BLOOD PRESSURE: 80 MMHG

## 2025-05-08 DIAGNOSIS — R92.8 ABNORMAL MAMMOGRAM: Primary | ICD-10-CM

## 2025-05-08 DIAGNOSIS — R92.8 ABNORMAL MAMMOGRAM OF RIGHT BREAST: ICD-10-CM

## 2025-05-08 PROCEDURE — 3008F BODY MASS INDEX DOCD: CPT | Performed by: SURGERY

## 2025-05-08 PROCEDURE — 99203 OFFICE O/P NEW LOW 30 MIN: CPT | Performed by: SURGERY

## 2025-05-08 NOTE — LETTER
May 9, 2025     Patient: Kyung Velazquez   YOB: 1975   Date of Visit: 5/8/2025       To Whom It May Concern:    Kyung Velazquez was seen in my clinic on 5/8/2025 at 9:15 am. Please excuse Kyung for her absence from work on this day to make the appointment.    If you have any questions or concerns, please don't hesitate to call.         Sincerely,         Teresa Wan MD        CC: No Recipients   5

## 2025-05-08 NOTE — LETTER
May 8, 2025     Patient: Kyung Velazquez   YOB: 1975   Date of Visit: 5/8/2025       To Whom It May Concern:    Kyung Velazquez was seen in my clinic on 5/8/2025 at 9:15 am. Please excuse Kyung for her absence from work on this day to make the appointment.    If you have any questions or concerns, please don't hesitate to call.         Sincerely,         Teresa Wan MD        CC: No Recipients

## 2025-05-09 NOTE — PROGRESS NOTES
Subjective   Patient ID: Kyung Velazquez is a 49 y.o. female who presents for New Patient Visit (NPV Abnormal Mammogram wanting consult, doesn't want biopsy if possible).  HPI  This is a pleasant patient who is here today because she wishes to discuss her recent mammogram.  The mammogram revealed some microcalcifications and 2 small groups in the upper outer right breast.  They have slightly increased in number since her previous exam.  There is questionable associated soft tissue asymmetry or distortion.  An ultrasound was performed and this did not show any cystic or solid lesion.  A stereotactic biopsy was recommended.    The patient is very reluctant to proceed with these recommendations.  She is currently 49 years old she has a diagnosis of probably a connective tissue disorder of some sort.  Carolina-Danlos syndrome runs in the family.  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  She sees a cardiologist at the [facility] for mitral valve prolapse.    She is wondering if there is some sort of alternative to a biopsy.    Reproductive history she had 1 pregnancy at the age of 19 she went through menopause around the age of 49 or currently.  She has had some hot flashes but that seems to have passed.  She is also experiencing some weight gain.  Review of Systems  10 point review is otherwise negative  Objective   Physical Exam head is normocephalic atraumatic eyes extraocular movements are intact pupils are equal and round lungs are clear bilaterally heart is regular rate and rhythm extremities do not reveal any gross deformities.    Assessment/Plan impression at this time is that of a abnormal mammogram, microcalcifications that have slightly increased in number.  I answered the patient's concerns and addressed that a blood test would not be helpful in diagnosing or ruling out ductal carcinoma in situ or even invasive cancer for that matter.  An MRI may shed some more light on  the situation but this too would not be 100% assurance that there is no ductal carcinoma in situ.  The patient also could just continue a course of observation to see if the microcalcifications remain stable if she indeed is unwilling to do the biopsy.    The patient has not had any bleeding issues therefore I do not feel that a biopsy is extremely risky for her but given her reluctance I am willing to order the MRI as a start and I have asked her to follow-up with me afterwards so that we can discuss this further.  {Assess/[email or web address]:[id number]}         Teresa Wan MD 05/09/25 3:21 PM

## 2025-05-15 ENCOUNTER — APPOINTMENT (OUTPATIENT)
Dept: SURGERY | Facility: CLINIC | Age: 50
End: 2025-05-15
Payer: COMMERCIAL

## 2025-06-11 ENCOUNTER — APPOINTMENT (OUTPATIENT)
Dept: RADIOLOGY | Facility: HOSPITAL | Age: 50
End: 2025-06-11
Payer: COMMERCIAL

## 2025-06-26 ENCOUNTER — HOSPITAL ENCOUNTER (OUTPATIENT)
Dept: RADIOLOGY | Facility: HOSPITAL | Age: 50
Discharge: HOME | End: 2025-06-26
Payer: COMMERCIAL

## 2025-06-26 DIAGNOSIS — R92.8 ABNORMAL MAMMOGRAM OF RIGHT BREAST: ICD-10-CM

## 2025-06-26 PROCEDURE — 77049 MRI BREAST C-+ W/CAD BI: CPT

## 2025-06-26 PROCEDURE — 2550000001 HC RX 255 CONTRASTS: Mod: JW | Performed by: SURGERY

## 2025-06-26 PROCEDURE — A9575 INJ GADOTERATE MEGLUMI 0.1ML: HCPCS | Mod: JW | Performed by: SURGERY

## 2025-06-26 PROCEDURE — 77049 MRI BREAST C-+ W/CAD BI: CPT | Performed by: RADIOLOGY

## 2025-06-26 RX ORDER — GADOTERATE MEGLUMINE 376.9 MG/ML
12 INJECTION INTRAVENOUS
Status: COMPLETED | OUTPATIENT
Start: 2025-06-26 | End: 2025-06-26

## 2025-06-26 RX ADMIN — GADOTERATE MEGLUMINE 12 ML: 376.9 INJECTION INTRAVENOUS at 14:09

## 2025-06-30 ENCOUNTER — TELEPHONE (OUTPATIENT)
Dept: SURGERY | Facility: HOSPITAL | Age: 50
End: 2025-06-30
Payer: COMMERCIAL

## 2025-06-30 NOTE — TELEPHONE ENCOUNTER
I called the patient and left her message that the MRI was negative for any sign of malignancy but we still standby our recommendation for a biopsy of the calcifications.  The MRI does not rule out the possibility of ductal carcinoma in situ.  I understand her reluctance for any procedures but that still is the recommendation even of the radiologist to read the MRI.

## 2025-07-26 DIAGNOSIS — K21.9 GASTROESOPHAGEAL REFLUX DISEASE WITHOUT ESOPHAGITIS: ICD-10-CM

## 2025-07-28 ENCOUNTER — TELEPHONE (OUTPATIENT)
Facility: CLINIC | Age: 50
End: 2025-07-28

## 2025-07-28 RX ORDER — PANTOPRAZOLE SODIUM 40 MG/1
40 TABLET, DELAYED RELEASE ORAL DAILY
Qty: 90 TABLET | Refills: 3 | Status: SHIPPED | OUTPATIENT
Start: 2025-07-28

## 2025-09-15 ENCOUNTER — APPOINTMENT (OUTPATIENT)
Facility: CLINIC | Age: 50
End: 2025-09-15
Payer: COMMERCIAL

## 2025-11-26 ENCOUNTER — APPOINTMENT (OUTPATIENT)
Dept: DERMATOLOGY | Facility: CLINIC | Age: 50
End: 2025-11-26
Payer: COMMERCIAL